# Patient Record
Sex: MALE | Race: WHITE | NOT HISPANIC OR LATINO | URBAN - METROPOLITAN AREA
[De-identification: names, ages, dates, MRNs, and addresses within clinical notes are randomized per-mention and may not be internally consistent; named-entity substitution may affect disease eponyms.]

---

## 2017-06-19 ENCOUNTER — EMERGENCY (EMERGENCY)
Facility: HOSPITAL | Age: 37
LOS: 1 days | Discharge: HOME | End: 2017-06-19
Attending: EMERGENCY MEDICINE | Admitting: EMERGENCY MEDICINE
Payer: COMMERCIAL

## 2017-06-19 VITALS
TEMPERATURE: 98 F | DIASTOLIC BLOOD PRESSURE: 68 MMHG | HEART RATE: 80 BPM | RESPIRATION RATE: 19 BRPM | SYSTOLIC BLOOD PRESSURE: 112 MMHG

## 2017-06-19 PROCEDURE — 99284 EMERGENCY DEPT VISIT MOD MDM: CPT | Mod: 25

## 2017-06-19 PROCEDURE — 93010 ELECTROCARDIOGRAM REPORT: CPT

## 2017-06-19 NOTE — ED ADULT NURSE NOTE - OBJECTIVE STATEMENT
36yr male presented to ED c/o L arm pain.  Pt reports sudden onset of L arm pain last night, Pt goes from shoulder to elbow, no sob, no dizziness, no numbness or tingling to arms or legs, no chest pain, no blurry vision or change in vision, no n/v/d, Pt also has bug bit behind the knee on the R leg.  Pt went to Urgent Care today was treated for the bug bite and told to come to the ED to do blood work to rule out a cardiac event

## 2017-06-20 VITALS
SYSTOLIC BLOOD PRESSURE: 114 MMHG | OXYGEN SATURATION: 98 % | DIASTOLIC BLOOD PRESSURE: 70 MMHG | HEART RATE: 78 BPM | RESPIRATION RATE: 17 BRPM

## 2017-06-20 LAB — TROPONIN T SERPL-MCNC: <0.01 NG/ML — SIGNIFICANT CHANGE UP (ref 0–0.06)

## 2017-06-20 PROCEDURE — 93005 ELECTROCARDIOGRAM TRACING: CPT

## 2017-06-20 PROCEDURE — 84484 ASSAY OF TROPONIN QUANT: CPT

## 2017-06-20 PROCEDURE — 99283 EMERGENCY DEPT VISIT LOW MDM: CPT | Mod: 25

## 2017-06-20 NOTE — ED PROVIDER NOTE - MEDICAL DECISION MAKING DETAILS
Kodak Manzo MD (resident): 36 M w/ intermittent sharp L upper arm pain that resolves after 2-4 hrs, w/o injury. No deformity, TTP, rash of the LUE. Normal pulses and normal strenght and sensation of BUE. No neuro or vascular deficits on examination. Not consistent w/ ACS based on lack of risk factors and no anginal equivalents. Kodak Manzo MD (resident): 36 M w/ intermittent sharp L upper arm pain that resolves after 2-4 hrs, w/o injury. No deformity, TTP, rash of the LUE. Normal pulses and normal strength and sensation of BUE. No neuro or vascular deficits on examination. Not consistent w/ ACS based on lack of risk factors and no anginal equivalents.  Pablo Reddy DO; see attending attestation

## 2017-06-20 NOTE — ED PROVIDER NOTE - NS ED ROS FT
No fever, no change in vision, no change in hearing, no chest pain, no shortness of breath, no abdominal pain, no vomiting, no dysuria, + muscle pain, no rashes, no loss of consciousness. ~ Kodak Manzo MD

## 2017-06-20 NOTE — ED PROVIDER NOTE - OBJECTIVE STATEMENT
2 episodes of LUE pain that occurred. Severe pain that occurred without trauma. Lasts for 2-4 hrs at a time. Sharp pain. Active individual who plays sports but w/o injury, plays tennis and golf, R arm dominate. 2 episodes of LUE pain that occurred. Severe pain that occurred without trauma. Lasts for 2-4 hrs at a time. Sharp pain. Active individual who plays sports but w/o injury, plays tennis and golf, R arm dominate. Seen at Urgent care, sent to ED after getting an EKG.

## 2017-06-20 NOTE — ED PROVIDER NOTE - ATTENDING CONTRIBUTION TO CARE
36yoM with sudden L arm pain "deep" intermittent, came on while pt sleeping. went to urgent care and was sent for cardiac workup. no trauma.   Full ROM shoulder. Pulses intact distally, no edema. no rash.   A: L arm pain, unclear etiology, intermittent, currently asymptomatic. Doubt ACS, but will send 1 set enzymes and ekg.

## 2017-06-20 NOTE — ED PROVIDER NOTE - PHYSICAL EXAMINATION
Physical Exam: young M in NAD at this time, AAOx3, NCAT, MMM, neck is supple, negative Spurling test, PERRL, CTAB, normal rate and regular rhythm, No deformity of extremities, No rashes, CN grossly intact, No focal motor or sensory deficits. No bony deformity or TTP.  ~ Kodak Manzo MD